# Patient Record
Sex: FEMALE | Race: WHITE | NOT HISPANIC OR LATINO | Employment: OTHER | URBAN - METROPOLITAN AREA
[De-identification: names, ages, dates, MRNs, and addresses within clinical notes are randomized per-mention and may not be internally consistent; named-entity substitution may affect disease eponyms.]

---

## 2023-05-15 ENCOUNTER — TELEPHONE (OUTPATIENT)
Dept: OTOLARYNGOLOGY | Facility: CLINIC | Age: 78
End: 2023-05-15
Payer: MEDICARE

## 2023-05-15 NOTE — TELEPHONE ENCOUNTER
----- Message from Sabrina Loza sent at 5/15/2023  2:19 PM CDT -----  Regarding: Referral  Good afternoon,    I received a referral on behalf of the patient above from Dr Taj Espino with a diagnosis of Right parotid lesion.  I have scanned the referral and notes into media mgr.  Please review and contact the patient to schedule.     Thank you,    Sabrina Loza  [facility]

## 2023-06-02 ENCOUNTER — OFFICE VISIT (OUTPATIENT)
Dept: OTOLARYNGOLOGY | Facility: CLINIC | Age: 78
End: 2023-06-02
Payer: MEDICARE

## 2023-06-02 VITALS
DIASTOLIC BLOOD PRESSURE: 69 MMHG | WEIGHT: 154.13 LBS | SYSTOLIC BLOOD PRESSURE: 171 MMHG | HEIGHT: 67 IN | BODY MASS INDEX: 24.19 KG/M2 | HEART RATE: 64 BPM

## 2023-06-02 DIAGNOSIS — K11.8 PAROTID MASS: Primary | ICD-10-CM

## 2023-06-02 PROCEDURE — 3078F PR MOST RECENT DIASTOLIC BLOOD PRESSURE < 80 MM HG: ICD-10-PCS | Mod: CPTII,S$GLB,, | Performed by: OTOLARYNGOLOGY

## 2023-06-02 PROCEDURE — 3077F PR MOST RECENT SYSTOLIC BLOOD PRESSURE >= 140 MM HG: ICD-10-PCS | Mod: CPTII,S$GLB,, | Performed by: OTOLARYNGOLOGY

## 2023-06-02 PROCEDURE — 1159F PR MEDICATION LIST DOCUMENTED IN MEDICAL RECORD: ICD-10-PCS | Mod: CPTII,S$GLB,, | Performed by: OTOLARYNGOLOGY

## 2023-06-02 PROCEDURE — 1160F RVW MEDS BY RX/DR IN RCRD: CPT | Mod: CPTII,S$GLB,, | Performed by: OTOLARYNGOLOGY

## 2023-06-02 PROCEDURE — 1126F PR PAIN SEVERITY QUANTIFIED, NO PAIN PRESENT: ICD-10-PCS | Mod: CPTII,S$GLB,, | Performed by: OTOLARYNGOLOGY

## 2023-06-02 PROCEDURE — 3077F SYST BP >= 140 MM HG: CPT | Mod: CPTII,S$GLB,, | Performed by: OTOLARYNGOLOGY

## 2023-06-02 PROCEDURE — 1159F MED LIST DOCD IN RCRD: CPT | Mod: CPTII,S$GLB,, | Performed by: OTOLARYNGOLOGY

## 2023-06-02 PROCEDURE — 3078F DIAST BP <80 MM HG: CPT | Mod: CPTII,S$GLB,, | Performed by: OTOLARYNGOLOGY

## 2023-06-02 PROCEDURE — 99999 PR PBB SHADOW E&M-EST. PATIENT-LVL III: CPT | Mod: PBBFAC,,, | Performed by: OTOLARYNGOLOGY

## 2023-06-02 PROCEDURE — 99204 PR OFFICE/OUTPT VISIT, NEW, LEVL IV, 45-59 MIN: ICD-10-PCS | Mod: S$GLB,,, | Performed by: OTOLARYNGOLOGY

## 2023-06-02 PROCEDURE — 1160F PR REVIEW ALL MEDS BY PRESCRIBER/CLIN PHARMACIST DOCUMENTED: ICD-10-PCS | Mod: CPTII,S$GLB,, | Performed by: OTOLARYNGOLOGY

## 2023-06-02 PROCEDURE — 99999 PR PBB SHADOW E&M-EST. PATIENT-LVL III: ICD-10-PCS | Mod: PBBFAC,,, | Performed by: OTOLARYNGOLOGY

## 2023-06-02 PROCEDURE — 99204 OFFICE O/P NEW MOD 45 MIN: CPT | Mod: S$GLB,,, | Performed by: OTOLARYNGOLOGY

## 2023-06-02 PROCEDURE — 1126F AMNT PAIN NOTED NONE PRSNT: CPT | Mod: CPTII,S$GLB,, | Performed by: OTOLARYNGOLOGY

## 2023-06-02 RX ORDER — DIPHENHYDRAMINE HYDROCHLORIDE 12.5 MG/5ML
LIQUID ORAL
COMMUNITY
Start: 2023-03-18

## 2023-06-02 RX ORDER — SODIUM CHLORIDE 0.9 % (FLUSH) 0.9 %
10 SYRINGE (ML) INJECTION
Status: CANCELLED | OUTPATIENT
Start: 2023-06-02

## 2023-06-02 RX ORDER — MELATONIN 5 MG
CAPSULE ORAL
COMMUNITY

## 2023-06-02 RX ORDER — LIDOCAINE HYDROCHLORIDE 10 MG/ML
1 INJECTION, SOLUTION EPIDURAL; INFILTRATION; INTRACAUDAL; PERINEURAL ONCE
Status: CANCELLED | OUTPATIENT
Start: 2023-06-02 | End: 2023-06-02

## 2023-06-02 RX ORDER — ASPIRIN 81 MG/1
1 TABLET ORAL DAILY
COMMUNITY

## 2023-06-02 RX ORDER — CLONAZEPAM 1 MG/1
1 TABLET ORAL NIGHTLY
COMMUNITY
Start: 2023-05-15

## 2023-06-04 PROBLEM — E78.00 PURE HYPERCHOLESTEROLEMIA: Status: ACTIVE | Noted: 2018-01-31

## 2023-06-04 NOTE — ASSESSMENT & PLAN NOTE
Apparently benign on prior fine-needle aspiration.  It is enlarging both per history and on CT scan.  I recommended that we proceed to the operating room for right parotidectomy, frozen section analysis of the tumor and possible neck dissection pending frozen section results.  She understands the plan and is amenable to surgery.  Surgery is scheduled on June 30, 2023.

## 2023-06-04 NOTE — PROGRESS NOTES
CC: R parotid mass      HPI   77 y.o. female presents for evaluation of a right-sided parotid mass.  She reports this lesion was initially noted incidentally on a scan several years ago.  Fine-needle aspiration of that time was apparently benign.  She reports this lesion has recently enlarged.  She denies pain.  She denies dysphagia.  She reports prior history of skin cancer of the face but believes that this was a basal cell carcinoma.  No other complaints today.    Review of Systems   Constitutional: Negative for fatigue and unexpected weight change.   HENT: Per HPI.  Eyes: Negative for visual disturbance.   Respiratory: Negative for shortness of breath, hemoptysis   Cardiovascular: Negative for chest pain and palpitations.   Musculoskeletal: Negative for decreased ROM, back pain.   Skin: Negative for rash, sunburn, itching.   Neurological: Negative for dizziness and seizures.   Hematological: Negative for adenopathy. Does not bruise/bleed easily.   Endocrine: Negative for rapid weight loss/weight gain, heat/cold intolerance.     Past Medical History   Patient Active Problem List   Diagnosis    Parotid mass    Pure hypercholesterolemia           Past Surgical History   History reviewed. No pertinent surgical history.      Family History   History reviewed. No pertinent family history.        Social History   .  Social History     Socioeconomic History    Marital status:          Allergies   Review of patient's allergies indicates:   Allergen Reactions    Levofloxacin     Norco [hydrocodone-acetaminophen]     Sucralfate     Sulfamethoprim            Physical Exam     Vitals:    06/02/23 1327   BP: (!) 171/69   Pulse: 64         Body mass index is 24.14 kg/m².      General: AOx3, NAD   Respiratory:  Symmetric chest rise, normal effort  Oral Cavity:  Oral Tongue mobile, no lesions noted. Hard Palate WNL. No buccal or FOM lesions.  Oropharynx:  No masses/lesions of the posterior pharyngeal wall. Tonsillar  fossa without lesions. Soft palate without masses. Midline uvula.   Neck: No scars.  No cervical lymphadenopathy, thyromegaly or thyroid nodules.  Normal range of motion.    Face: House Brackmann I bilaterally.  Roughly 2.5 cm firm, mobile right parotid mass.    Neck CT, outside records reviewed.    Assessment/Plan  Problem List Items Addressed This Visit          ENT    Parotid mass - Primary     Apparently benign on prior fine-needle aspiration.  It is enlarging both per history and on CT scan.  I recommended that we proceed to the operating room for right parotidectomy, frozen section analysis of the tumor and possible neck dissection pending frozen section results.  She understands the plan and is amenable to surgery.  Surgery is scheduled on June 30, 2023.         Relevant Orders    Case Request Operating Room: EXCISION, PAROTID GLAND, DISSECTION, NECK, RADICAL (Completed)

## 2023-06-22 ENCOUNTER — TELEPHONE (OUTPATIENT)
Dept: OTOLARYNGOLOGY | Facility: CLINIC | Age: 78
End: 2023-06-22
Payer: MEDICARE

## 2023-06-22 NOTE — TELEPHONE ENCOUNTER
----- Message from Andra Meadows RN sent at 6/22/2023  4:11 PM CDT -----  This patient is currently scheduled for parotid gland excision and radical neck dissection with you on 6/30/23. Patient states she spoke with her insurance and they said her procedure would only be covered at [facility]? She has questions on what to do and is pretty anxious. Can someone give her a call to advise.     Thanks,   Andra

## 2023-06-22 NOTE — TELEPHONE ENCOUNTER
I spoke with Ms. Mcdaniels. We discussed how her insurance is currently not accepted ar [facility] and Dr. Jiménez does not operate at the [facility]. She loves Dr. Jiménez and wish she could proceed but cannot afford the cash price. Surgery will be cancelled and she discussed reaching back out to Dr. Espino.